# Patient Record
Sex: FEMALE | Race: WHITE | NOT HISPANIC OR LATINO | URBAN - METROPOLITAN AREA
[De-identification: names, ages, dates, MRNs, and addresses within clinical notes are randomized per-mention and may not be internally consistent; named-entity substitution may affect disease eponyms.]

---

## 2020-03-13 ENCOUNTER — IMPORTED ENCOUNTER (OUTPATIENT)
Dept: URBAN - METROPOLITAN AREA CLINIC 1 | Facility: CLINIC | Age: 80
End: 2020-03-13

## 2020-03-13 PROBLEM — Z96.1: Noted: 2020-03-13

## 2020-03-13 PROBLEM — H04.123: Noted: 2020-03-13

## 2020-03-13 PROCEDURE — 92015 DETERMINE REFRACTIVE STATE: CPT

## 2020-03-13 PROCEDURE — 92004 COMPRE OPH EXAM NEW PT 1/>: CPT

## 2020-03-13 NOTE — PATIENT DISCUSSION
1.  Dry Eyes OU - Recommend AT's BIDOU routinely 2. Pseudophakia OU - Not PMG  MRX for glasses given. Return for an appointment in 1 year 27 with Dr. Analilia Meehan.

## 2021-03-16 ENCOUNTER — IMPORTED ENCOUNTER (OUTPATIENT)
Dept: URBAN - METROPOLITAN AREA CLINIC 1 | Facility: CLINIC | Age: 81
End: 2021-03-16

## 2021-03-16 PROBLEM — H04.123: Noted: 2021-03-16

## 2021-03-16 PROBLEM — H26.493: Noted: 2021-03-16

## 2021-03-16 PROBLEM — H10.45: Noted: 2021-03-16

## 2021-03-16 PROBLEM — Z96.1: Noted: 2021-03-16

## 2021-03-16 PROCEDURE — 99214 OFFICE O/P EST MOD 30 MIN: CPT

## 2021-03-16 PROCEDURE — 92015 DETERMINE REFRACTIVE STATE: CPT

## 2021-03-16 NOTE — PATIENT DISCUSSION
1.  PCO OU (Posterior Capsule Opacification) -- Observe and consider yag cap when pt feels pco visually significant and visual acuity decreases to appropriate level. 2. Dry Eyes OU -- Cont ATs BID-QID OU routinely. 3.  Allergic Conjunctivitis OU -- Recommended the use of Zaditor BID OU or Pataday QD OU PRN itching (Handout Given). 4. Pseudophakia OU -- Doing well (2014; Dr. Shabana Rios). MRx for glasses given to patient. Return for an appointment in 1 year 27 with Dr. Cherelle Barahona.

## 2021-06-01 NOTE — PATIENT DISCUSSION
The patient feels that the cataract is significantly impacting daily activities and has elected to proceed with a cataract consultation.

## 2022-03-16 ENCOUNTER — COMPREHENSIVE EXAM (OUTPATIENT)
Dept: URBAN - METROPOLITAN AREA CLINIC 1 | Facility: CLINIC | Age: 82
End: 2022-03-16

## 2022-03-16 DIAGNOSIS — Z96.1: ICD-10-CM

## 2022-03-16 DIAGNOSIS — H10.45: ICD-10-CM

## 2022-03-16 DIAGNOSIS — H26.493: ICD-10-CM

## 2022-03-16 DIAGNOSIS — H04.123: ICD-10-CM

## 2022-03-16 PROCEDURE — 99214 OFFICE O/P EST MOD 30 MIN: CPT

## 2022-03-16 PROCEDURE — 92015 DETERMINE REFRACTIVE STATE: CPT

## 2022-03-16 ASSESSMENT — VISUAL ACUITY
OS_SC: J1+
OD_SC: 20/20-1

## 2022-03-16 ASSESSMENT — TONOMETRY
OS_IOP_MMHG: 16
OD_IOP_MMHG: 14

## 2022-03-16 NOTE — PATIENT DISCUSSION
(Allergic) Recommend OTC Pataday QD OU, PRN itching. Condition discussed with patient. Avoidance of allergens recommended.

## 2022-04-02 ASSESSMENT — VISUAL ACUITY
OD_CC: 20/20-1
OS_GLARE: 20/40
OD_GLARE: 20/40
OD_CC: J1
OS_CC: J1
OD_SC: 20/25-2
OS_SC: 20/40
OD_SC: J7
OS_SC: J1
OS_CC: 20/150

## 2022-04-02 ASSESSMENT — TONOMETRY
OS_IOP_MMHG: 15
OD_IOP_MMHG: 13
OD_IOP_MMHG: 14
OS_IOP_MMHG: 14

## 2022-12-12 ENCOUNTER — EMERGENCY VISIT (OUTPATIENT)
Dept: URBAN - METROPOLITAN AREA CLINIC 1 | Facility: CLINIC | Age: 82
End: 2022-12-12

## 2022-12-12 PROCEDURE — 92012 INTRM OPH EXAM EST PATIENT: CPT

## 2022-12-12 ASSESSMENT — VISUAL ACUITY
OD_SC: 20/25
OS_SC: J1+

## 2022-12-12 ASSESSMENT — TONOMETRY
OD_IOP_MMHG: 15
OS_IOP_MMHG: 15

## 2024-04-25 ENCOUNTER — COMPREHENSIVE EXAM (OUTPATIENT)
Dept: URBAN - METROPOLITAN AREA CLINIC 1 | Facility: CLINIC | Age: 84
End: 2024-04-25

## 2024-04-25 DIAGNOSIS — H10.45: ICD-10-CM

## 2024-04-25 DIAGNOSIS — H16.143: ICD-10-CM

## 2024-04-25 DIAGNOSIS — H04.123: ICD-10-CM

## 2024-04-25 DIAGNOSIS — H26.493: ICD-10-CM

## 2024-04-25 DIAGNOSIS — H01.021: ICD-10-CM

## 2024-04-25 DIAGNOSIS — H01.024: ICD-10-CM

## 2024-04-25 DIAGNOSIS — Z96.1: ICD-10-CM

## 2024-04-25 PROCEDURE — 99214 OFFICE O/P EST MOD 30 MIN: CPT

## 2024-04-25 ASSESSMENT — TONOMETRY
OS_IOP_MMHG: 14
OD_IOP_MMHG: 14

## 2024-04-25 ASSESSMENT — VISUAL ACUITY
OS_SC: J1
OD_SC: 20/25